# Patient Record
Sex: FEMALE | Race: WHITE | NOT HISPANIC OR LATINO | Employment: OTHER | RURAL
[De-identification: names, ages, dates, MRNs, and addresses within clinical notes are randomized per-mention and may not be internally consistent; named-entity substitution may affect disease eponyms.]

---

## 2017-04-06 ENCOUNTER — OFFICE VISIT (OUTPATIENT)
Dept: CARDIOLOGY | Facility: CLINIC | Age: 67
End: 2017-04-06

## 2017-04-06 VITALS
WEIGHT: 178 LBS | RESPIRATION RATE: 20 BRPM | HEART RATE: 74 BPM | DIASTOLIC BLOOD PRESSURE: 80 MMHG | BODY MASS INDEX: 26.36 KG/M2 | SYSTOLIC BLOOD PRESSURE: 120 MMHG | HEIGHT: 69 IN

## 2017-04-06 DIAGNOSIS — I87.2 VENOUS INSUFFICIENCY (CHRONIC) (PERIPHERAL): ICD-10-CM

## 2017-04-06 DIAGNOSIS — I48.20 CHRONIC ATRIAL FIBRILLATION (HCC): Primary | ICD-10-CM

## 2017-04-06 PROCEDURE — 93000 ELECTROCARDIOGRAM COMPLETE: CPT | Performed by: INTERNAL MEDICINE

## 2017-04-06 PROCEDURE — 99213 OFFICE O/P EST LOW 20 MIN: CPT | Performed by: INTERNAL MEDICINE

## 2017-04-06 RX ORDER — ALENDRONATE SODIUM 70 MG/1
70 TABLET ORAL
COMMUNITY

## 2017-04-06 RX ORDER — MELATONIN
1000 DAILY
COMMUNITY

## 2017-04-06 NOTE — PROGRESS NOTES
Subjective:     Encounter Date:04/06/2017      Patient ID: Dora Gupta is a 66 y.o. female.    Chief Complaint: CAF, mild dyspnea, venous insufficiency    History of Present Illness     Dear Dr. Solis,     I had the pleasure of seeing the patient in cardiac followup today.  As you well know, she is a shonda 66-year-old woman with history of chronic atrial fibrillation.  She is on chronic warfarin for this.   I have been following her once yearly.  Over the past year, she had cataract surgery without any difficulty.       She has chronic venous insufficiency, and some mild lower extremity edema without change.       Over the past year, she has developed some very mild dyspnea.   I asked her to explore this further and she thought that’s it was pretty minor and she just wants to watch it for now.          Review of Systems   All other systems reviewed and are negative.        ECG 12 Lead  Date/Time: 4/6/2017 11:51 AM  Performed by: ULISSES MCMAHAN  Authorized by: ULISSES MCMAHAN   Comparison: compared with previous ECG   Similar to previous ECG  Rhythm: atrial fibrillation  BPM: 74                 Objective:     Physical Exam   Constitutional: She is oriented to person, place, and time. She appears well-developed and well-nourished.   HENT:   Head: Normocephalic and atraumatic.   Neck: Normal range of motion. Neck supple.   Cardiovascular: Normal rate and normal heart sounds.  An irregularly irregular rhythm present.   Pulmonary/Chest: Effort normal and breath sounds normal.   Abdominal: Soft. Bowel sounds are normal.   Musculoskeletal: Normal range of motion.   Neurological: She is alert and oriented to person, place, and time.   Skin: Skin is warm and dry.   Psychiatric: She has a normal mood and affect. Her behavior is normal. Thought content normal.   Vitals reviewed.      Lab Review:       Assessment:          Diagnosis Plan   1. Chronic atrial fibrillation     2. Venous insufficiency (chronic) (peripheral)             Plan:        It was a pleasure to see the patient in cardiac followup today.  She is stable from the cardiac standpoint.  She is anticoagulated for chronic atrial fibrillation.   Her edema is chronic and well managed.      If her dyspnea progresses, she will contact me and I will probably start with an echocardiogram. Otherwise, I will see her again in one year.       Atrial Fibrillation and Atrial Flutter  Assessment  • The patient has permanent atrial fibrillation  • This is non-valvular in etiology  • The patient's CHADS2-VASc score is 2  • A [id number] score of 2 or more is considered a high risk for a thromboembolic event  • Warfarin prescribed    Plan  • Continue in atrial fibrillation with rate control  • Continue warfarin for antithrombotic therapy, bleeding issues discussed  • Continue beta blocker and digoxin for rate control

## 2018-02-22 RX ORDER — WARFARIN SODIUM 3 MG/1
TABLET ORAL
Qty: 90 TABLET | Refills: 0 | Status: SHIPPED | OUTPATIENT
Start: 2018-02-22

## 2018-03-15 RX ORDER — WARFARIN SODIUM 6 MG/1
TABLET ORAL
Qty: 30 TABLET | Refills: 3 | Status: SHIPPED | OUTPATIENT
Start: 2018-03-15

## 2018-04-05 ENCOUNTER — OFFICE VISIT (OUTPATIENT)
Dept: CARDIOLOGY | Facility: CLINIC | Age: 68
End: 2018-04-05

## 2018-04-05 VITALS
BODY MASS INDEX: 25.62 KG/M2 | SYSTOLIC BLOOD PRESSURE: 120 MMHG | HEIGHT: 69 IN | HEART RATE: 61 BPM | DIASTOLIC BLOOD PRESSURE: 68 MMHG | WEIGHT: 173 LBS

## 2018-04-05 DIAGNOSIS — I48.20 CHRONIC ATRIAL FIBRILLATION (HCC): Primary | ICD-10-CM

## 2018-04-05 DIAGNOSIS — I87.2 VENOUS INSUFFICIENCY (CHRONIC) (PERIPHERAL): ICD-10-CM

## 2018-04-05 PROCEDURE — 99213 OFFICE O/P EST LOW 20 MIN: CPT | Performed by: INTERNAL MEDICINE

## 2018-04-05 PROCEDURE — 93000 ELECTROCARDIOGRAM COMPLETE: CPT | Performed by: INTERNAL MEDICINE

## 2018-04-05 NOTE — PROGRESS NOTES
Subjective:     Encounter Date:04/05/2018      Patient ID: Dora Gupta is a 67 y.o. female.    Chief Complaint: CAF, venous insufficiency    History of Present Illness    Dear Dr. Solis,     I had the pleasure of seeing your patient Dora Gupta in cardiac followup today.  As you well know, she is a shonda 67-year-old woman with history of chronic atrial fibrillation and chronic venous insufficiency.  She comes in for her annual followup.  She reports doing great over the past year.  She has not had any problems with angina, dyspnea, palpitations or syncope.  She has some chronic right knee arthritis which is getting worse.  Her exercise has been a little limited because of this.  She is tolerating warfarin without difficulty.          Review of Systems   All other systems reviewed and are negative.        ECG 12 Lead  Date/Time: 4/5/2018 11:48 AM  Performed by: ULISSES MCMAHAN  Authorized by: ULISSES MCMAHAN   Comparison: compared with previous ECG   Similar to previous ECG  Rhythm: atrial fibrillation  BPM: 61                 Objective:     Physical Exam   Constitutional: She is oriented to person, place, and time. She appears well-developed and well-nourished.   HENT:   Head: Normocephalic and atraumatic.   Neck: Normal range of motion. Neck supple.   Cardiovascular: Normal rate and normal heart sounds.  An irregularly irregular rhythm present.   Pulmonary/Chest: Effort normal and breath sounds normal.   Abdominal: Soft. Bowel sounds are normal.   Musculoskeletal: Normal range of motion.   Neurological: She is alert and oriented to person, place, and time.   Skin: Skin is warm and dry.   Psychiatric: She has a normal mood and affect. Her behavior is normal. Thought content normal.   Vitals reviewed.      Lab Review:       Assessment:          Diagnosis Plan   1. Chronic atrial fibrillation     2. Venous insufficiency (chronic) (peripheral)            Plan:       It was a pleasure to see your patient in cardiac  followup today.  She is doing quite well from the cardiac standpoint without any complaints related to her atrial fibrillation.  She has had no bleeding or embolic events.  She tolerates warfarin without difficulty.      She has chronic venous insufficiency without change.      I have encouraged her to increase her physical activity.  She will see me again in one year or sooner if symptoms warrant.      Atrial Fibrillation and Atrial Flutter  Assessment  • The patient has permanent atrial fibrillation  • This is non-valvular in etiology  • The patient's CHADS2-VASc score is 2  • A [id number] score of 2 or more is considered a high risk for a thromboembolic event  • Warfarin prescribed    Plan  • Continue in atrial fibrillation with rate control  • Continue warfarin for antithrombotic therapy, bleeding issues discussed  • Continue beta blocker and digoxin for rate control

## 2019-04-18 ENCOUNTER — OFFICE VISIT (OUTPATIENT)
Dept: CARDIOLOGY | Facility: CLINIC | Age: 69
End: 2019-04-18

## 2019-04-18 VITALS
HEART RATE: 85 BPM | WEIGHT: 171 LBS | SYSTOLIC BLOOD PRESSURE: 122 MMHG | DIASTOLIC BLOOD PRESSURE: 66 MMHG | HEIGHT: 69 IN | BODY MASS INDEX: 25.33 KG/M2

## 2019-04-18 DIAGNOSIS — I48.20 CHRONIC ATRIAL FIBRILLATION (HCC): Primary | ICD-10-CM

## 2019-04-18 DIAGNOSIS — I87.2 VENOUS INSUFFICIENCY (CHRONIC) (PERIPHERAL): ICD-10-CM

## 2019-04-18 PROCEDURE — 93000 ELECTROCARDIOGRAM COMPLETE: CPT | Performed by: INTERNAL MEDICINE

## 2019-04-18 PROCEDURE — 99213 OFFICE O/P EST LOW 20 MIN: CPT | Performed by: INTERNAL MEDICINE

## 2019-04-18 RX ORDER — LEVOTHYROXINE SODIUM 88 UG/1
88 TABLET ORAL DAILY
Refills: 1 | COMMUNITY
Start: 2019-04-05

## 2019-04-18 RX ORDER — PREDNISOLONE ACETATE 10 MG/ML
SUSPENSION/ DROPS OPHTHALMIC
Refills: 0 | COMMUNITY
Start: 2019-03-12

## 2019-04-18 NOTE — PROGRESS NOTES
Subjective:     Encounter Date:04/18/2019      Patient ID: Dora Gupta is a 68 y.o. female.    Chief Complaint: CAF, venous insufficiency    History of Present Illness    Dear Dr. Solis,     I had the pleasure of seeing your patient in cardiac followup today. As you well know she is a shonda 68-year-old woman with history of chronic atrial fibrillation and chronic venous insufficiency. She is maintained on chronic warfarin for anticoagulation. Since I have last seen her she has been diagnosed with arthritis in her upper extremity. She has been trying CBD oil to see if this helps. She does not have any symptoms of heart failure of palpitations.         Review of Systems   All other systems reviewed and are negative.        ECG 12 Lead  Date/Time: 4/18/2019 2:23 PM  Performed by: Jonas Badillo MD  Authorized by: Jonas Badillo MD   Comparison: compared with previous ECG   Similar to previous ECG  Rhythm: atrial fibrillation  BPM: 85                 Objective:     Physical Exam   Constitutional: She is oriented to person, place, and time. She appears well-developed and well-nourished.   HENT:   Head: Normocephalic and atraumatic.   Neck: Normal range of motion. Neck supple.   Cardiovascular: Normal rate and normal heart sounds. An irregularly irregular rhythm present.   Pulmonary/Chest: Effort normal and breath sounds normal.   Abdominal: Soft. Bowel sounds are normal.   Musculoskeletal: Normal range of motion.   Neurological: She is alert and oriented to person, place, and time.   Skin: Skin is warm and dry.   Psychiatric: She has a normal mood and affect. Her behavior is normal. Thought content normal.   Vitals reviewed.      Lab Review:       Assessment:          Diagnosis Plan   1. Chronic atrial fibrillation (CMS/HCC)     2. Venous insufficiency (chronic) (peripheral)            Plan:       It was a pleasure to see your patient in cardiac followup today. She is doing quite well from a cardiac standpoint  without any complaints. She remains anticoagulated for stroke prevention. I see no problem with her trying over-the-counter therapies to reduce her arthritis pain. She will see me again in 1 year or sooner if symptoms warrant.    Atrial Fibrillation and Atrial Flutter  Assessment  • The patient has permanent atrial fibrillation  • This is non-valvular in etiology  • The patient's CHADS2-VASc score is 2  • A [id number] score of 2 or more is considered a high risk for a thromboembolic event  • Warfarin prescribed    Plan  • Continue in atrial fibrillation with rate control  • Continue warfarin for antithrombotic therapy, bleeding issues discussed  • Continue beta blocker and digoxin for rate control